# Patient Record
Sex: MALE | Race: WHITE
[De-identification: names, ages, dates, MRNs, and addresses within clinical notes are randomized per-mention and may not be internally consistent; named-entity substitution may affect disease eponyms.]

---

## 2017-06-05 ENCOUNTER — HOSPITAL ENCOUNTER (OUTPATIENT)
Dept: HOSPITAL 45 - C.LABBFT | Age: 52
Discharge: HOME | End: 2017-06-05
Attending: PHYSICIAN ASSISTANT
Payer: COMMERCIAL

## 2017-06-05 DIAGNOSIS — M10.9: ICD-10-CM

## 2017-06-05 DIAGNOSIS — Z12.5: ICD-10-CM

## 2017-06-05 DIAGNOSIS — R73.09: Primary | ICD-10-CM

## 2017-06-05 DIAGNOSIS — D64.9: ICD-10-CM

## 2017-06-05 DIAGNOSIS — E78.00: ICD-10-CM

## 2017-06-05 DIAGNOSIS — Z11.59: ICD-10-CM

## 2017-06-05 LAB
ALBUMIN/GLOB SERPL: 0.8 {RATIO} (ref 0.9–2)
ALP SERPL-CCNC: 87 U/L (ref 45–117)
ALT SERPL-CCNC: 32 U/L (ref 12–78)
ANION GAP SERPL CALC-SCNC: 10 MMOL/L (ref 3–11)
APPEARANCE UR: CLEAR
AST SERPL-CCNC: 18 U/L (ref 15–37)
BASOPHILS # BLD: 0.04 K/UL (ref 0–0.2)
BASOPHILS NFR BLD: 0.6 %
BILIRUB UR-MCNC: (no result) MG/DL
BUN SERPL-MCNC: 10 MG/DL (ref 7–18)
BUN/CREAT SERPL: 9.5 (ref 10–20)
CALCIUM SERPL-MCNC: 9.1 MG/DL (ref 8.5–10.1)
CHLORIDE SERPL-SCNC: 109 MMOL/L (ref 98–107)
CHOLEST/HDLC SERPL: 4.7 {RATIO}
CO2 SERPL-SCNC: 24 MMOL/L (ref 21–32)
COLOR UR: YELLOW
COMPLETE: YES
CREAT SERPL-MCNC: 1 MG/DL (ref 0.6–1.4)
EOSINOPHIL NFR BLD AUTO: 258 K/UL (ref 130–400)
EST. AVERAGE GLUCOSE BLD GHB EST-MCNC: 128 MG/DL
GLOBULIN SER-MCNC: 4.2 GM/DL (ref 2.5–4)
GLUCOSE SERPL-MCNC: 104 MG/DL (ref 70–99)
GLUCOSE UR QL: 45 MG/DL
HCT VFR BLD CALC: 40.6 % (ref 42–52)
IG%: 0.3 %
IMM GRANULOCYTES NFR BLD AUTO: 26.3 %
KETONES UR QL STRIP: 126 MG/DL
LYMPHOCYTES # BLD: 1.75 K/UL (ref 1.2–3.4)
MANUAL MICROSCOPIC REQUIRED?: NO
MCH RBC QN AUTO: 30.8 PG (ref 25–34)
MCHC RBC AUTO-ENTMCNC: 33.5 G/DL (ref 32–36)
MCV RBC AUTO: 91.9 FL (ref 80–100)
MONOCYTES NFR BLD: 11.7 %
NEUTROPHILS # BLD AUTO: 2.4 %
NEUTROPHILS NFR BLD AUTO: 58.7 %
NITRITE UR QL STRIP: (no result)
NITRITE UR QL STRIP: 194 MG/DL (ref 0–150)
PH UR STRIP: 5 [PH] (ref 4.5–7.5)
PH UR: 210 MG/DL (ref 0–200)
PMV BLD AUTO: 10.8 FL (ref 7.4–10.4)
POTASSIUM SERPL-SCNC: 3.9 MMOL/L (ref 3.5–5.1)
PSA SERPL-MCNC: 1 NG/ML (ref 0–4)
RBC # BLD AUTO: 4.42 M/UL (ref 4.7–6.1)
REVIEW REQ?: NO
SODIUM SERPL-SCNC: 143 MMOL/L (ref 136–145)
SP GR UR STRIP: 1.02 (ref 1–1.03)
URATE SERPL-MCNC: 9.9 MG/DL (ref 2.6–7.2)
URINE EPITHELIAL CELL AUTO: (no result) /LPF (ref 0–5)
UROBILINOGEN UR-MCNC: (no result) MG/DL
VERY LOW DENSITY LIPOPROT CALC: 39 MG/DL
WBC # BLD AUTO: 6.66 K/UL (ref 4.8–10.8)
ZZUR CULT IF INDIC CLEAN CATCH: NO

## 2017-06-06 ENCOUNTER — HOSPITAL ENCOUNTER (OUTPATIENT)
Dept: HOSPITAL 45 - C.LABBFT | Age: 52
Discharge: HOME | End: 2017-06-06
Attending: INTERNAL MEDICINE
Payer: COMMERCIAL

## 2017-06-06 DIAGNOSIS — R77.1: Primary | ICD-10-CM

## 2017-06-09 LAB
ALBUMIN MFR UR ELPH: 100 %
ALPHA-2-GLOBULIN %: 0 %
BETA GLOBULIN %: 0 %
CREAT UR-MCNC: 141 MG/DL (ref 20–370)
GAMMA GLOB 24H MFR UR ELPH: 0 %
IMP & REVIEW OF LAB RESULTS: (no result)

## 2017-10-16 ENCOUNTER — HOSPITAL ENCOUNTER (OUTPATIENT)
Dept: HOSPITAL 45 - C.LABBFT | Age: 52
Discharge: HOME | End: 2017-10-16
Attending: INTERNAL MEDICINE
Payer: COMMERCIAL

## 2017-10-16 DIAGNOSIS — R73.03: ICD-10-CM

## 2017-10-16 DIAGNOSIS — E78.00: ICD-10-CM

## 2017-10-16 DIAGNOSIS — R77.1: ICD-10-CM

## 2017-10-16 DIAGNOSIS — M10.9: Primary | ICD-10-CM

## 2017-10-16 LAB
ALBUMIN/GLOB SERPL: 0.8 {RATIO} (ref 0.9–2)
ALP SERPL-CCNC: 94 U/L (ref 45–117)
ALT SERPL-CCNC: 21 U/L (ref 12–78)
ANION GAP SERPL CALC-SCNC: 8 MMOL/L (ref 3–11)
AST SERPL-CCNC: 13 U/L (ref 15–37)
BUN SERPL-MCNC: 17 MG/DL (ref 7–18)
BUN/CREAT SERPL: 15.1 (ref 10–20)
CALCIUM SERPL-MCNC: 8.6 MG/DL (ref 8.5–10.1)
CHLORIDE SERPL-SCNC: 109 MMOL/L (ref 98–107)
CHOLEST/HDLC SERPL: 7.2 {RATIO}
CO2 SERPL-SCNC: 23 MMOL/L (ref 21–32)
CREAT SERPL-MCNC: 1.1 MG/DL (ref 0.6–1.4)
EST. AVERAGE GLUCOSE BLD GHB EST-MCNC: 114 MG/DL
GLOBULIN SER-MCNC: 4.1 GM/DL (ref 2.5–4)
GLUCOSE SERPL-MCNC: 108 MG/DL (ref 70–99)
GLUCOSE UR QL: 36 MG/DL
KETONES UR QL STRIP: 180 MG/DL
NITRITE UR QL STRIP: 214 MG/DL (ref 0–150)
PH UR: 259 MG/DL (ref 0–200)
POTASSIUM SERPL-SCNC: 4.2 MMOL/L (ref 3.5–5.1)
SODIUM SERPL-SCNC: 140 MMOL/L (ref 136–145)
URATE SERPL-MCNC: 10.3 MG/DL (ref 2.6–7.2)
VERY LOW DENSITY LIPOPROT CALC: 43 MG/DL

## 2018-03-05 ENCOUNTER — HOSPITAL ENCOUNTER (OUTPATIENT)
Dept: HOSPITAL 45 - C.LABBFT | Age: 53
Discharge: HOME | End: 2018-03-05
Attending: INTERNAL MEDICINE
Payer: COMMERCIAL

## 2018-03-05 DIAGNOSIS — M10.9: ICD-10-CM

## 2018-03-05 DIAGNOSIS — R73.03: Primary | ICD-10-CM

## 2018-03-05 DIAGNOSIS — D64.9: ICD-10-CM

## 2018-03-05 DIAGNOSIS — E78.00: ICD-10-CM

## 2018-03-05 LAB
ALT SERPL-CCNC: 44 U/L (ref 12–78)
AST SERPL-CCNC: 17 U/L (ref 15–37)
BASOPHILS # BLD: 0.07 K/UL (ref 0–0.2)
BASOPHILS NFR BLD: 1 %
EOS ABS #: 0.2 K/UL (ref 0–0.5)
EOSINOPHIL NFR BLD AUTO: 364 K/UL (ref 130–400)
HBA1C MFR BLD: 5.8 % (ref 4.5–5.6)
HCT VFR BLD CALC: 38.7 % (ref 42–52)
HGB BLD-MCNC: 13.1 G/DL (ref 14–18)
IG#: 0.01 K/UL (ref 0–0.02)
IMM GRANULOCYTES NFR BLD AUTO: 32.5 %
KETONES UR QL STRIP: 187 MG/DL
LYMPHOCYTES # BLD: 2.29 K/UL (ref 1.2–3.4)
MCH RBC QN AUTO: 31.3 PG (ref 25–34)
MCHC RBC AUTO-ENTMCNC: 33.9 G/DL (ref 32–36)
MCV RBC AUTO: 92.4 FL (ref 80–100)
MONO ABS #: 0.63 K/UL (ref 0.11–0.59)
MONOCYTES NFR BLD: 8.9 %
NEUT ABS #: 3.85 K/UL (ref 1.4–6.5)
NEUTROPHILS # BLD AUTO: 2.8 %
NEUTROPHILS NFR BLD AUTO: 54.7 %
PH UR: 254 MG/DL (ref 0–200)
PMV BLD AUTO: 10.4 FL (ref 7.4–10.4)
RED CELL DISTRIBUTION WIDTH CV: 14.1 % (ref 11.5–14.5)
RED CELL DISTRIBUTION WIDTH SD: 47.9 FL (ref 36.4–46.3)
URATE SERPL-MCNC: 5.3 MG/DL (ref 2.6–7.2)
WBC # BLD AUTO: 7.05 K/UL (ref 4.8–10.8)

## 2018-03-27 ENCOUNTER — HOSPITAL ENCOUNTER (OUTPATIENT)
Dept: HOSPITAL 45 - C.MRI | Age: 53
Discharge: HOME | End: 2018-03-27
Attending: PHYSICIAN ASSISTANT
Payer: COMMERCIAL
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
## 2018-03-27 DIAGNOSIS — S83.242A: ICD-10-CM

## 2018-03-27 DIAGNOSIS — M25.462: Primary | ICD-10-CM

## 2018-03-27 NOTE — DIAGNOSTIC IMAGING REPORT
MRI OF THE LEFT KNEE



CLINICAL HISTORY:  Left knee pain and stiffness. Joint effusion.



COMPARISON STUDY: No priors.



TECHNIQUE: MRI of the left knee was performed utilizing proton density, T1, and

T2-weighted sequences in the axial, sagittal, coronal planes. IV contrast was

not administered for this examination. Examination is degraded by motion

artifact. Note that interpretation is suboptimal without plain film correlate.



FINDINGS:



Menisci: There is complex tearing involving the body and posterior horn of the

medial meniscus. This is best seen on coronal images #20-22. The medial meniscus

appears truncated, and a flipped fragment is questioned in the gutter along the

lateral margin of the medial tibial plateau on image #21. A parameniscal cyst is

seen posterior to the medial tibial plateau on sagittal image #21 and measures 7

mm. The lateral meniscus appears intact.



Ligaments: The anterior and posterior cruciate ligaments are intact. The medial

collateral alignment appears intact. There is at least high-grade

partial-thickness tearing of the fibular collateral ligament near the femoral

insertion. The iliotibial band appears intact. There is also likely tearing of

the lateral capsule.



Extensor mechanism: The extensor mechanism is intact. Hoffa's fat pad is normal

in appearance.



Articular cartilage and bone: There is less than 50% fissuring of the articular

cartilage along the lateral patellar facet. There is mild degenerative thinning

with foci of approximately 50% fissuring along the weightbearing surface in the

medial compartment. Articular cartilage of the lateral compartment appears

maintained. There is reactive marrow edema and subchondral cyst formation

identified along the peripheral aspect of the medial tibial plateau. Marrow

edema/contusion is also seen within the peripheral aspect of the lateral femoral

condyle. There is no MRI evidence of fracture. A calcified fabella is

incidentally noted.



Joint effusion: There is a moderate joint effusion.



Soft tissues: The musculature surrounding the knee joint is normal in bulk.

There is edema identified within the body of the popliteus muscle. There is

thickening and irregularity of the popliteus tendon at the femoral insertion,

best seen on coronal image #20. The majority of the fibers remain intact. There

is significant superficial and deep soft tissue edema identified around the

knee, greatest anteriorly and laterally.





IMPRESSION: 



1. There is complex tearing involving the body and posterior horn of medial

meniscus. The meniscus appears truncated, and a flipped fragment is suspected in

the gutter along the lateral margin of the medial tibial plateau.



2. Findings indicate a posterolateral corner injury.



3. There is edema within the body of the popliteus muscle with at least

high-grade partial thickness tearing of the popliteus tendon near the femoral

insertion. There is associated edema within the lateral femoral condyle.



4. There is at least high-grade partial thickness tearing of the fibular

collateral ligament near the femoral insertion.



5. Joint effusion.



6. Mild arthritic change as above with associated marrow edema in the medial

tibial plateau.







Electronically signed by:  Cristiano Acosta M.D.

3/27/2018 10:41 AM



Dictated Date/Time:  3/27/2018 9:27 AM

## 2018-03-27 NOTE — DIAGNOSTIC IMAGING REPORT
BONY ORBITS 3 VIEWS



CLINICAL HISTORY: MRI clearance.



FINDINGS: 3 views of the bony orbits are obtained. No prior studies are

available for comparison at the time of dictation. There is no

radiodense/metallic foreign body seen in the region of the bony orbits. The bony

orbits are intact as imaged. The visualized paranasal sinuses and the mastoid

air cells appear clear. The imaged calvarium appears intact.



IMPRESSION: There is no radiodense/metallic foreign body seen in the region of

the bony orbits. 







Electronically signed by:  Cristiano Acosta M.D.

3/27/2018 8:53 AM



Dictated Date/Time:  3/27/2018 8:52 AM